# Patient Record
Sex: MALE | Race: WHITE | NOT HISPANIC OR LATINO | URBAN - METROPOLITAN AREA
[De-identification: names, ages, dates, MRNs, and addresses within clinical notes are randomized per-mention and may not be internally consistent; named-entity substitution may affect disease eponyms.]

---

## 2018-10-02 ENCOUNTER — EMERGENCY (EMERGENCY)
Facility: HOSPITAL | Age: 23
LOS: 1 days | Discharge: HOME | End: 2018-10-02
Admitting: EMERGENCY MEDICINE
Payer: MEDICAID

## 2018-10-02 VITALS
RESPIRATION RATE: 17 BRPM | DIASTOLIC BLOOD PRESSURE: 75 MMHG | HEART RATE: 92 BPM | SYSTOLIC BLOOD PRESSURE: 122 MMHG | TEMPERATURE: 99 F | OXYGEN SATURATION: 99 %

## 2018-10-02 DIAGNOSIS — S31.119D LACERATION WITHOUT FOREIGN BODY OF ABDOMINAL WALL, UNSPECIFIED QUADRANT WITHOUT PENETRATION INTO PERITONEAL CAVITY, SUBSEQUENT ENCOUNTER: ICD-10-CM
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
## 2018-10-02 PROCEDURE — 99282 EMERGENCY DEPT VISIT SF MDM: CPT

## 2018-10-02 NOTE — ED PROVIDER NOTE - PROGRESS NOTE DETAILS
Pt discussed with Dr. Ba, general surgery on call.  Packing should be removed today as it ideally should have been removed 3 days after placement.  Will refer pt to [facility] for further wound care as pt does not want to f/u at original hospital.
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
## 2018-10-02 NOTE — ED PROVIDER NOTE - MEDICAL DECISION MAKING DETAILS
24 y/o M presents to ED with horizontal abd wound with packing placed and retention sutures.  Packing removed without difficulty, pt tolerating well. Pt advised to take antibiotic as prescribed and f/u at [facility] within 2 days.  Return precautions advised.  Wound care advised. 24 y/o M presents to ED with horizontal abd wound with packing placed and retention sutures.  Packing removed without difficulty, pt tolerating well.  There is minimal serous sanguinous drainage from R side of wound.  Pt advised to take antibiotic as prescribed and f/u at [facility] within 2 days.  Return precautions advised.  Wound care advised.

## 2018-10-02 NOTE — ED PROVIDER NOTE - SKIN, MLM
Laceration across the abdomen with 2 packing sites at both ends of the laceration. No erythema, purulent drainage, or streaking. Laceration across the abdomen with retention sutures, 2 dry packing sites at both ends of the laceration. No erythema, purulent drainage, or streaking. 20 cm horizontal laceration across mid abdomen with packing in through, dry on both ends.  Minimal serous drainage near R end of laceration.  No surrounding erythema, fluctuance or induration.

## 2018-10-02 NOTE — ED PROVIDER NOTE - NSFOLLOWUPINSTRUCTIONS_ED_ALL_ED_FT
Follow up with [facility].  Call for follow up appointment.    [phone]  Take antibiotic as prescribed until complete.  Go to ED if unable to secure follow up appointment or if you have increased pain, redness or drainage.

## 2021-04-22 NOTE — ED PROVIDER NOTE - TEMPLATE, MLM
Group Topic:  Group Psychotherapy    Date: 4/22/2021  Start Time:  1:00 PM  End Time:  4:00 PM  Facilitators: Josephine Almonte LPC    4/22/2021  Facilitator(s): Josephine Almonte LPC    Method: Group  Attendance: Present  Participation: Active   Patient report of any safety concerns: No  Physical concerns reported: Yes - lower back pain   Drugs/alcohol reported:No  Taking medications as prescribed:Yes  Mood: Appropriate  Affect: Normal and Appropriate to Content  Thought Process: Congruent, some minor rumination  Perceptual Problems: None  Speech: Clear/articulate, Loud   Behavior/Socialization: Appropriate to Group  Task Performance: Follows directions  Patient Response: Attentive, Appropriate feedback      Group Topic:  Process Group:    Start Time: 1:00 PM  End Time: 2:00 PM  Number of group participants: 7  Patient’s response to group and/or progress towards treatment goals: Pt apologized to group for his behavior from last Friday the 16th. Pt also reported that he had moved out of [facility] into his own apartment, and that it is \"one hundred times better\" and that he is already feeling better because of it. Pt also opened up and shared his struggles he has with his children, namely with them not listening to him and understanding why he utilizes individual therapy.     Group Topic:  Behavioral Activation Group:    Start Time: 2:00 PM  End Time: 3:00 PM  Number of group participants: 7  Goals identified:   Things the pt wants to do:  Start talking walks, “begin living and enjoy life”, start meeting new friends      Things they want to get done/work on/that they have been putting off:  call doctor about shots for back pain injections, continue unpacking, go to the library      Things they do not want to do/behaviors to avoid: “attempt overdosing again”, [denied urge, ideation and plan], avoid/reduce negative thoughts, avoid/reduce seeing or dealing with any negative people      Patient’s response of  group: Pt was attentive and engaged during group. Pt was very receptive of feedback from other group members. Pt provided some occasional feedback to group members when they shared.       Group Topic: BH Coping Skills Education:    Start Time: 3:00 PM  End Time: 4:00 PM  Number of group participants: 7  Therapeutic intervention(s) introduced to group: Interpersonal Effectiveness - Relationship Effectiveness and Self Respect Effectiveness Skills  A presentation was given on relationship effectiveness and self respect effectiveness skills that fall under interpersonal effectiveness. The importance of these skills was discussed amongst group. The acronyms GIVE and FAST were taught to assist in remembering and using these skills.     Patient’s response to group: Pt was attentive and engaged during group. Pt provided an example of times where others being ingenuine had made him less receptive to giving them what they wanted.    Wound Check/Suture Removal

## 2022-03-02 NOTE — ED PROVIDER NOTE - NSTIMEPROVIDERCAREINITIATE_GEN_ER
Is This A New Presentation, Or A Follow-Up?: Skin Lesion What Type Of Note Output Would You Prefer (Optional)?: Standard Output How Severe Is Your Skin Lesion?: moderate 02-Oct-2018 17:20